# Patient Record
Sex: FEMALE | RURAL
[De-identification: names, ages, dates, MRNs, and addresses within clinical notes are randomized per-mention and may not be internally consistent; named-entity substitution may affect disease eponyms.]

---

## 2020-10-27 ENCOUNTER — APPOINTMENT (RX ONLY)
Dept: RURAL CLINIC 3 | Facility: CLINIC | Age: 50
Setting detail: DERMATOLOGY
End: 2020-10-27

## 2020-10-27 DIAGNOSIS — L28.0 LICHEN SIMPLEX CHRONICUS: ICD-10-CM

## 2020-10-27 PROCEDURE — ? DIAGNOSIS COMMENT

## 2020-10-27 PROCEDURE — 99201: CPT

## 2020-10-27 PROCEDURE — ? COUNSELING

## 2020-10-27 PROCEDURE — ? PRESCRIPTION

## 2020-10-27 RX ORDER — CLOBETASOL PROPIONATE 0.5 MG/G
OINTMENT TOPICAL
Qty: 1 | Refills: 0 | Status: ERX | COMMUNITY
Start: 2020-10-27

## 2020-10-27 RX ADMIN — CLOBETASOL PROPIONATE: 0.5 OINTMENT TOPICAL at 00:00

## 2020-10-27 ASSESSMENT — LOCATION DETAILED DESCRIPTION DERM: LOCATION DETAILED: RIGHT INFERIOR MEDIAL LOWER BACK

## 2020-10-27 ASSESSMENT — LOCATION ZONE DERM: LOCATION ZONE: TRUNK

## 2020-10-27 ASSESSMENT — LOCATION SIMPLE DESCRIPTION DERM: LOCATION SIMPLE: RIGHT LOWER BACK

## 2020-10-27 NOTE — PROCEDURE: DIAGNOSIS COMMENT
Comment: Patient states she had a small lesion present in this area for about 3 years and then in the last month began itching and enlarging.  On exam today it is a scaly pink plaque consistent with LSC, and I do not see a separate small lesion within it.  I recommended that she return in 1 month even if she feels the bulk of the rash has resolved, as I feel we need to evaluate for any residual lesion that might be present.
Detail Level: Simple

## 2020-12-11 ENCOUNTER — APPOINTMENT (RX ONLY)
Dept: RURAL CLINIC 3 | Facility: CLINIC | Age: 50
Setting detail: DERMATOLOGY
End: 2020-12-11

## 2020-12-11 DIAGNOSIS — L28.0 LICHEN SIMPLEX CHRONICUS: ICD-10-CM | Status: RESOLVED

## 2020-12-11 DIAGNOSIS — L30.9 DERMATITIS, UNSPECIFIED: ICD-10-CM

## 2020-12-11 PROCEDURE — ? TREATMENT REGIMEN

## 2020-12-11 PROCEDURE — 99212 OFFICE O/P EST SF 10 MIN: CPT

## 2020-12-11 PROCEDURE — ? ADDITIONAL NOTES

## 2020-12-11 ASSESSMENT — LOCATION ZONE DERM
LOCATION ZONE: NECK
LOCATION ZONE: TRUNK

## 2020-12-11 ASSESSMENT — LOCATION SIMPLE DESCRIPTION DERM
LOCATION SIMPLE: LEFT UPPER BACK
LOCATION SIMPLE: POSTERIOR NECK

## 2020-12-11 ASSESSMENT — LOCATION DETAILED DESCRIPTION DERM
LOCATION DETAILED: MID POSTERIOR NECK
LOCATION DETAILED: LEFT SUPERIOR MEDIAL UPPER BACK

## 2020-12-11 NOTE — PROCEDURE: ADDITIONAL NOTES
Additional Notes: This is a nonspecific eczematous dermatitis patient states occurred immediately upon institution of treatment with clobetasol for her LSC.  She did not apply any of the clobetasol to these areas but states that the clobetasol must of caused this rash.  The rash is doing better since she stopped clobetasol 3 weeks ago.  I offered a different topical steroid to help resolve this dermatitis, but patient declines.  She elects to proceed with moisturizing only.  Pt will call for an rx if doesn’t get better
Detail Level: Simple